# Patient Record
Sex: MALE | Race: WHITE | URBAN - METROPOLITAN AREA
[De-identification: names, ages, dates, MRNs, and addresses within clinical notes are randomized per-mention and may not be internally consistent; named-entity substitution may affect disease eponyms.]

---

## 2021-03-05 ENCOUNTER — DOCUMENTATION ONLY (OUTPATIENT)
Dept: FAMILY MEDICINE CLINIC | Age: 26
End: 2021-03-05

## 2021-03-05 ENCOUNTER — OFFICE VISIT (OUTPATIENT)
Dept: FAMILY MEDICINE CLINIC | Age: 26
End: 2021-03-05

## 2021-03-05 DIAGNOSIS — Z02.5 SPORTS PHYSICAL: Primary | ICD-10-CM

## 2021-03-05 DIAGNOSIS — U07.1 COVID-19: ICD-10-CM

## 2021-03-05 NOTE — PROGRESS NOTES
SUBJECTIVE:    Lea Doll is a 23 y.o. male who presents for a pre-participation physical.     1. Chest pain, shortness of breath or wheezing with exercise: None  2. Syncope with exercise: None  3. History of heart murmur or HTN: None  4. Concussions or head injury: 2017, soccer practice- soccer ball hit the back of head; lingering symptoms of headache, sensitivity to light x 4 days (while at [facility])  5. History of Seizure: None  6. Heat illness: None  7. Disqualifications or restrictions from sports participation in the past: None  8. Injuries to muscle, tendon, or ligament: 2017- hyperextension of knee, had tib plateau fracture  9. Fractured bone: tibial plateau fracture  10. Stress fracture: None  11. Ongoing medical conditions: None  12. Ever needed an inhaler for exercise: No  13. Sickle Cell disease or trait: None  14. Surgeries: Tonsillectomy/Adenoidectomy at age 2  12. Any unpaired organs: None  16. Vision impairment: None   17. Glasses or Contacts: None  18. Hearing impairment: None  19. Weight changes: None   20. Trying to gain/lose weight: No    COVID 12/2020, tested positive- noted chills, headache, body aches; no fever- symptoms last 4 days    Family History:  1. CAD, MI, or sudden death before the age of 48: No  2. HTN: No  3. Diabetes: No  4. Asthma: No  5. Sickle cell trait or disease: No  Maternal grandmother- leukemia    No past medical history on file. Not on File    OBJECTIVE:   There were no vitals taken for this visit. General: Alert and oriented, in no acute distress. Well nourished. SKIN: No rash. No suspicious lesions or moles. EYE: PERRL. Sclera and conjuctival clear. Extraocular movements intact. EARS: External normal, canals clear, tympanic membranes normal.   NOSE: Mucosa healthy without drainage or ulceration.   OROPHARYNX: No suspicious lesions, normal dentition, pharynx, tongue and tonsils normal.  NECK: Supple; no masses; thyroid normal.  LUNGS: Respirations unlabored; clear to auscultation bilaterally. CARDIOVASCULAR: Regular, rate, and rhythm without murmurs, gallops or rubs. ABDOMEN: Soft; nontender; nondistended; normoactive bowel sounds; no masses or organomegaly. LYMPH NODES: No significant cervial or inguinal lymphadenopathy. MUSCULOSKELETAL. NECK: FROM without pain. No cervical vertebral tenderness. BACK: FROM without pain. No obvious deformity. No vertebral tenderness. SHOULDER: FROM without pain. No AC, SC, or clavicle tenderness. RTC and deltoid strength 5/5 bilaterally. No joint laxity detected. ELBOW: FROM without pain. Flexion and extension strength 5/5 bilaterally. WRIST/HAND/FINGERS: FROM without pain.  strength 5/5 bilaterally. Wrist extension and flexion strength 5/5 bilaterally. HIP: FROM without pain. Flexion, extension, abduction, adduction strength 5/5 bilaterally. KNEE: FROM without pain. Flexion and extension strength 5/5 bilaterally. No joint laxity detected. ANKLE: FROM without pain. Flexion, extension, inversion, and eversion strength 5/5 bilaterally. No joint laxity detected. EXT: No edema. Neurovascularlly intact. Normal gait. ASSESSMENT:  Preparticipation physical exam demonstrates no reason for disqualification or restrictions. PLAN:    1. Patient is able to participate in physical activity without any restrictions. 2. Resting EKG showed normal sinus rhythm.

## 2021-03-05 NOTE — PROGRESS NOTES
SUBJECTIVE:    Efrain Joy is a 23 y.o. male who presents for a pre-participation physical.                1. Chest pain, shortness of breath or wheezing with exercise: None  2. Syncope with exercise: None  3. History of heart murmur or HTN: None  4. Concussions or head injury: 2017, soccer practice- soccer ball hit the back of head; lingering symptoms of headache, sensitivity to light x 4 days (while at [facility])  5. History of Seizure: None  6. Heat illness: None  7. Disqualifications or restrictions from sports participation in the past: None  8. Injuries to muscle, tendon, or ligament: 2017- hyperextension of knee, had tib plateau fracture  9. Fractured bone: tibial plateau fracture  10. Stress fracture: None  11. Ongoing medical conditions: None  12. Ever needed an inhaler for exercise: No  13. Sickle Cell disease or trait: None  14. Surgeries: Tonsillectomy/Adenoidectomy at age 2  12. Any unpaired organs: None  16. Vision impairment: None     17. Glasses or Contacts: None  18. Hearing impairment: None  19. Weight changes: None       20. Trying to gain/lose weight: No     COVID 12/2020, tested positive- noted chills, headache, body aches; no fever- symptoms last 4 days     Family History:  1. CAD, MI, or sudden death before the age of 48: No  2. HTN: No  3. Diabetes: No  4. Asthma: No  5. Sickle cell trait or disease: No  Maternal grandmother- leukemia     No past medical history on file.     Not on File     OBJECTIVE:   There were no vitals taken for this visit. General: Alert and oriented, in no acute distress. Well nourished. SKIN: No rash. No suspicious lesions or moles. EYE: PERRL. Sclera and conjuctival clear. Extraocular movements intact. EARS: External normal, canals clear, tympanic membranes normal.   NOSE: Mucosa healthy without drainage or ulceration.   OROPHARYNX: No suspicious lesions, normal dentition, pharynx, tongue and tonsils normal.  NECK: Supple; no masses; thyroid normal.  LUNGS: Respirations unlabored; clear to auscultation bilaterally. CARDIOVASCULAR: Regular, rate, and rhythm without murmurs, gallops or rubs. ABDOMEN: Soft; nontender; nondistended; normoactive bowel sounds; no masses or organomegaly. LYMPH NODES: No significant cervial or inguinal lymphadenopathy. MUSCULOSKELETAL. NECK: FROM without pain. No cervical vertebral tenderness. BACK: FROM without pain. No obvious deformity. No vertebral tenderness. SHOULDER: FROM without pain. No AC, SC, or clavicle tenderness. RTC and deltoid strength 5/5 bilaterally. No joint laxity detected. ELBOW: FROM without pain. Flexion and extension strength 5/5 bilaterally. WRIST/HAND/FINGERS: FROM without pain.  strength 5/5 bilaterally. Wrist extension and flexion strength 5/5 bilaterally. HIP: FROM without pain. Flexion, extension, abduction, adduction strength 5/5 bilaterally. KNEE: FROM without pain. Flexion and extension strength 5/5 bilaterally. No joint laxity detected. ANKLE: FROM without pain. Flexion, extension, inversion, and eversion strength 5/5 bilaterally. No joint laxity detected. EXT: No edema. Neurovascularlly intact. Normal gait.        ASSESSMENT:  Preparticipation physical exam demonstrates no reason for disqualification or restrictions. PLAN:    1. Patient is able to participate in physical activity without any restrictions. 2. Resting EKG showed normal sinus rhythm.

## 2022-05-31 ENCOUNTER — OFFICE VISIT (OUTPATIENT)
Dept: FAMILY MEDICINE CLINIC | Age: 27
End: 2022-05-31

## 2022-05-31 VITALS
HEIGHT: 73 IN | OXYGEN SATURATION: 100 % | SYSTOLIC BLOOD PRESSURE: 128 MMHG | HEART RATE: 55 BPM | TEMPERATURE: 97.3 F | RESPIRATION RATE: 18 BRPM | DIASTOLIC BLOOD PRESSURE: 81 MMHG

## 2022-05-31 DIAGNOSIS — S99.921A INJURY OF RIGHT FOOT, INITIAL ENCOUNTER: Primary | ICD-10-CM

## 2022-05-31 PROCEDURE — 99213 OFFICE O/P EST LOW 20 MIN: CPT | Performed by: FAMILY MEDICINE

## 2022-05-31 NOTE — PROGRESS NOTES
[id number] [facility]      Chief Complaint:   Chief Complaint   Patient presents with    Foot Injury     right foot injured on saturday. states he has swelling, feels pressure, and has pain on the top of his foot. has pain when weight bearing         Subjective:     Ramón Paredes is an 33 y.o. male who presents with right foot pain. 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 He was going for a slide tackle when the opponent landed landed on his foot. He was able to play about another 25 minutes but states he could only sprint about 80-90% due to the pain. At half time, the pain and swelling increased and he was unable to continue to play. He has been using crutches to assist ambulation due to pain with walking. No numbness or tingling. No past medical history on file. No family history on file. No Known Allergies  Social History     Socioeconomic History    Marital status: SINGLE     Spouse name: Not on file    Number of children: Not on file    Years of education: Not on file    Highest education level: Not on file   Occupational History    Not on file   Tobacco Use    Smoking status: Not on file    Smokeless tobacco: Not on file   Substance and Sexual Activity    Alcohol use: Not on file    Drug use: Not on file    Sexual activity: Not on file   Other Topics Concern    Not on file   Social History Narrative    Not on file     Social Determinants of Health     Financial Resource Strain:     Difficulty of Paying Living Expenses: Not on file   Food Insecurity:     Worried About Running Out of Food in the Last Year: Not on file    Sonia of Food in the Last Year: Not on file   Transportation Needs:     Lack of Transportation (Medical): Not on file    Lack of Transportation (Non-Medical):  Not on file   Physical Activity:     Days of Exercise per Week: Not on file    Minutes of Exercise per Session: Not on file   Stress:     Feeling of Stress : Not on file   Social Connections:     Frequency of Communication with Friends and Family: Not on file    Frequency of Social Gatherings with Friends and Family: Not on file    Attends Jainism Services: Not on file    Active Member of Clubs or Organizations: Not on file    Attends Club or Organization Meetings: Not on file    Marital Status: Not on file   Intimate Partner Violence:     Fear of Current or Ex-Partner: Not on file    Emotionally Abused: Not on file    Physically Abused: Not on file    Sexually Abused: Not on file   Housing Stability:     Unable to Pay for Housing in the Last Year: Not on file    Number of [place] in the Last Year: Not on file    Unstable Housing in the Last Year: Not on file       Review of Systems  Pertinent items are noted in HPI. Objective:     Visit Vitals  /81 (BP 1 Location: Right upper arm, BP Patient Position: Sitting, BP Cuff Size: Adult)   Pulse (!) 55   Temp 97.3 °F (36.3 °C) (Temporal)   Resp 18   Ht 6' 1\" (1.854 m)   SpO2 100%       Gen: No apparent distress. Alert and oriented. Responds to all questions appropriately. Ankle/Foot: right  Ankle Effusion: None  Great Toe MTP (normal/valgus/hallux rigidis/varus): Normal    ROM: FROM of the ankle. ROM of the all toes extension and flexion limited due to pain but able to perform. Dynamic Test:  Gait: NWB using crutches to assist ambulation due to pain.    Bell test: Normal  Tiptoe walk: unable to perform due to pain  Heel walk: unable to perform due to pain  Single heel rise: unable to perform due to pain    Palpation:  ATFL tenderness: None  CFL tenderness: None  PTFL tenderness: None  Deltoid tenderness: None  Achilles insertion tenderness: None   Achilles tendon tenderness: None   Great Toe IP joint tenderness: None  Great Toe MTP join tenderness t: None  Lisfranc Joint tenderness: mild tenderness but majority of the tenderness is lateral to the Lisfranc joint. Medial Malleolus tenderness: None  Lateral Malleolus tenderness: None  5th Metatarsal tenderness: None  Metatarsals tenderness: tender over the proximal 2-4 metatarsals. Navicular tenderness: None    Squeeze Test: Normal     Strength (0-5/5): Limited exam due to pain but able to move in all directions for the ankle and toes and strength appears intact. Plantarflexion: 5/5  Dorsiflexion: 5/5  Inversion: 5/5  Eversion: 5/5  FHL: 5/5  EHL: 5/5    Neuro/Vascular:  Pulses intact, no edema, and neurologically intact. Skin: No obvious rash    Imaging: Radiographs of the right foot personally reviewed and demonstrates no obvious fracture or dislocation. Assessment:       ICD-10-CM ICD-9-CM    1. Injury of right foot, initial encounter  S99.921A 959.7 XR FOOT RT MIN 3 V     Pain in the foot likely related to deep bruising over the dorsal foot. Will focus on swelling control and ROM. As pain improves then will advance ambulation and training as tolerated. Unlikely Lisfranc injury as the LOAN not consistent with Lisfranc injury and he was able to continue to play for 25 minutes until swelling and pain prevented further participation. If unable to advance in return to play, then will get MRI of the foot. Plan:       1. Home Exercise Program as per handout. He will work with [employer] for rehab.   2. Ice 15 minutes, three times a day PRN and after exercise. Can alternate with heat for 15 minutes. Medications:    1. Naproxin (Aleve): 220mg 1-2 tablets twice a day PRN. 2. Acetaminophen (Tylenol):  500mg 1-2 tablets every 6 hours as needed for pain. RTC: He will be followed up in the training room. If sx not improving, then will get MRI.

## 2022-05-31 NOTE — PROGRESS NOTES
Andrés De La Cruz is a 33 y.o. male    Chief Complaint   Patient presents with    Foot Injury     right foot injured on saturday. states he has swelling, feels pressure, and has pain on the top of his foot. has pain when weight bearing       1. \"Have you been to the ER, urgent care clinic since your last visit? Hospitalized since your last visit? \" No    2. \"Have you seen or consulted any other health care providers outside of the [address] [place] [address] since your last visit? \" No     3. For patients aged 39-70: Has the patient had a colonoscopy / FIT/ Cologuard? NA - based on age        [de-identified]:    05/31/22 0923   BP: 128/81   BP 1 Location: Right upper arm   BP Patient Position: Sitting   BP Cuff Size: Adult   Pulse: (!) 55   Temp: 97.3 °F (36.3 °C)   TempSrc: Temporal   Resp: 18   Height: 6' 1\" (1.854 m)   SpO2: 100%             Health Maintenance Due   Topic Date Due    Hepatitis C Screening  Never done    Depression Screen  Never done    COVID-19 Vaccine (1) Never done    DTaP/Tdap/Td series (1 - Tdap) Never done         Medication Reconciliation completed, changes noted.   Please update medication list.

## 2023-09-25 ENCOUNTER — OFFICE VISIT (OUTPATIENT)
Age: 28
End: 2023-09-25
Payer: COMMERCIAL

## 2023-09-25 ENCOUNTER — TELEPHONE (OUTPATIENT)
Age: 28
End: 2023-09-25

## 2023-09-25 ENCOUNTER — HOSPITAL ENCOUNTER (OUTPATIENT)
Facility: HOSPITAL | Age: 28
Discharge: HOME | End: 2023-09-27
Attending: FAMILY MEDICINE
Payer: COMMERCIAL

## 2023-09-25 VITALS
WEIGHT: 179 LBS | HEIGHT: 71 IN | BODY MASS INDEX: 25.06 KG/M2 | DIASTOLIC BLOOD PRESSURE: 68 MMHG | SYSTOLIC BLOOD PRESSURE: 110 MMHG

## 2023-09-25 VITALS
OXYGEN SATURATION: 99 % | SYSTOLIC BLOOD PRESSURE: 110 MMHG | WEIGHT: 179 LBS | DIASTOLIC BLOOD PRESSURE: 68 MMHG | HEART RATE: 51 BPM | BODY MASS INDEX: 23.62 KG/M2

## 2023-09-25 DIAGNOSIS — R06.09 DOE (DYSPNEA ON EXERTION): ICD-10-CM

## 2023-09-25 DIAGNOSIS — R00.2 PALPITATIONS: ICD-10-CM

## 2023-09-25 DIAGNOSIS — R06.02 SHORTNESS OF BREATH: Primary | ICD-10-CM

## 2023-09-25 DIAGNOSIS — R00.0 TACHYCARDIA: ICD-10-CM

## 2023-09-25 DIAGNOSIS — R06.02 SHORTNESS OF BREATH: ICD-10-CM

## 2023-09-25 LAB
ECHO AV MEAN GRADIENT: 3 MMHG
ECHO AV MEAN VELOCITY: 0.9 M/S
ECHO AV PEAK GRADIENT: 6 MMHG
ECHO AV PEAK VELOCITY: 1.2 M/S
ECHO AV VELOCITY RATIO: 1
ECHO AV VTI: 24.1 CM
ECHO BSA: 2.02 M2
ECHO LA DIAMETER INDEX: 1.69 CM/M2
ECHO LA DIAMETER: 3.4 CM
ECHO LA VOL 2C: 63 ML (ref 18–58)
ECHO LA VOL 2C: 67 ML (ref 18–58)
ECHO LA VOL 4C: 38 ML (ref 18–58)
ECHO LA VOL 4C: 44 ML (ref 18–58)
ECHO LA VOL BP: 50 ML (ref 18–58)
ECHO LA VOL/BSA BIPLANE: 25 ML/M2 (ref 16–34)
ECHO LA VOLUME AREA LENGTH: 56 ML
ECHO LA VOLUME INDEX AREA LENGTH: 28 ML/M2 (ref 16–34)
ECHO LV E' LATERAL VELOCITY: 10 CM/S
ECHO LV E' SEPTAL VELOCITY: 10 CM/S
ECHO LV EDV A2C: 197 ML
ECHO LV EDV A4C: 122 ML
ECHO LV EDV BP: 161 ML (ref 67–155)
ECHO LV EDV INDEX A4C: 61 ML/M2
ECHO LV EDV INDEX BP: 80 ML/M2
ECHO LV EDV NDEX A2C: 98 ML/M2
ECHO LV EJECTION FRACTION A2C: 60 %
ECHO LV EJECTION FRACTION A4C: 49 %
ECHO LV EJECTION FRACTION BIPLANE: 56 % (ref 55–100)
ECHO LV ESV A2C: 79 ML
ECHO LV ESV A4C: 62 ML
ECHO LV ESV BP: 71 ML (ref 22–58)
ECHO LV ESV INDEX A2C: 39 ML/M2
ECHO LV ESV INDEX A4C: 31 ML/M2
ECHO LV ESV INDEX BP: 35 ML/M2
ECHO LV FRACTIONAL SHORTENING: 30 % (ref 28–44)
ECHO LV INTERNAL DIMENSION DIASTOLE INDEX: 2.64 CM/M2
ECHO LV INTERNAL DIMENSION DIASTOLIC MMODE: 5.6 CM (ref 4.2–5.9)
ECHO LV INTERNAL DIMENSION DIASTOLIC: 5.3 CM (ref 4.2–5.9)
ECHO LV INTERNAL DIMENSION SYSTOLIC INDEX: 1.84 CM/M2
ECHO LV INTERNAL DIMENSION SYSTOLIC MMODE: 4.2 CM
ECHO LV INTERNAL DIMENSION SYSTOLIC: 3.7 CM
ECHO LV IVSD: 0.9 CM (ref 0.6–1)
ECHO LV MASS 2D: 162.1 G (ref 88–224)
ECHO LV MASS INDEX 2D: 80.7 G/M2 (ref 49–115)
ECHO LV POSTERIOR WALL DIASTOLIC: 0.8 CM (ref 0.6–1)
ECHO LV RELATIVE WALL THICKNESS RATIO: 0.3
ECHO LVOT AV VTI INDEX: 1
ECHO LVOT MEAN GRADIENT: 3 MMHG
ECHO LVOT PEAK GRADIENT: 6 MMHG
ECHO LVOT PEAK VELOCITY: 1.2 M/S
ECHO LVOT VTI: 24.2 CM
ECHO MV A VELOCITY: 0.45 M/S
ECHO MV E DECELERATION TIME (DT): 461.9 MS
ECHO MV E VELOCITY: 0.85 M/S
ECHO MV E/A RATIO: 1.89
ECHO MV E/E' LATERAL: 8.5
ECHO MV E/E' RATIO (AVERAGED): 8.5
ECHO MV E/E' SEPTAL: 8.5
ECHO MV LVOT VTI INDEX: 1.05
ECHO MV MAX VELOCITY: 0.8 M/S
ECHO MV MEAN GRADIENT: 1 MMHG
ECHO MV MEAN VELOCITY: 0.4 M/S
ECHO MV PEAK GRADIENT: 3 MMHG
ECHO MV VTI: 25.3 CM
ECHO PV MAX VELOCITY: 0.9 M/S
ECHO PV PEAK GRADIENT: 3 MMHG

## 2023-09-25 PROCEDURE — 93306 TTE W/DOPPLER COMPLETE: CPT | Performed by: SPECIALIST

## 2023-09-25 PROCEDURE — 99213 OFFICE O/P EST LOW 20 MIN: CPT | Performed by: FAMILY MEDICINE

## 2023-09-25 PROCEDURE — 93306 TTE W/DOPPLER COMPLETE: CPT

## 2023-09-25 PROCEDURE — 93000 ELECTROCARDIOGRAM COMPLETE: CPT | Performed by: FAMILY MEDICINE

## 2023-09-25 SDOH — ECONOMIC STABILITY: FOOD INSECURITY: WITHIN THE PAST 12 MONTHS, THE FOOD YOU BOUGHT JUST DIDN'T LAST AND YOU DIDN'T HAVE MONEY TO GET MORE.: NEVER TRUE

## 2023-09-25 SDOH — ECONOMIC STABILITY: INCOME INSECURITY: HOW HARD IS IT FOR YOU TO PAY FOR THE VERY BASICS LIKE FOOD, HOUSING, MEDICAL CARE, AND HEATING?: NOT HARD AT ALL

## 2023-09-25 SDOH — ECONOMIC STABILITY: HOUSING INSECURITY
IN THE LAST 12 MONTHS, WAS THERE A TIME WHEN YOU DID NOT HAVE A STEADY PLACE TO SLEEP OR SLEPT IN A SHELTER (INCLUDING NOW)?: NO

## 2023-09-25 SDOH — ECONOMIC STABILITY: FOOD INSECURITY: WITHIN THE PAST 12 MONTHS, YOU WORRIED THAT YOUR FOOD WOULD RUN OUT BEFORE YOU GOT MONEY TO BUY MORE.: NEVER TRUE

## 2023-09-25 ASSESSMENT — PATIENT HEALTH QUESTIONNAIRE - PHQ9
SUM OF ALL RESPONSES TO PHQ QUESTIONS 1-9: 0
2. FEELING DOWN, DEPRESSED OR HOPELESS: 0
SUM OF ALL RESPONSES TO PHQ9 QUESTIONS 1 & 2: 0
SUM OF ALL RESPONSES TO PHQ QUESTIONS 1-9: 0
1. LITTLE INTEREST OR PLEASURE IN DOING THINGS: 0

## 2023-09-25 NOTE — PROGRESS NOTES
[address] [facility]      Chief Complaint:   Chief Complaint   Patient presents with    Shortness of Breath       SUBJECTIVE:  Erick Campbell is a 33 y.o. male who presents for intermittent palpitations and tachycardia. Sx for about 2 years and have come about once a month. Last episode was about 2 weeks ago. Sx seem to come more frequent over the last 6 months. 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 When he had the episode 2 weeks ago he was able to track his heart rate and noticed that his heart rate was in the 120-140s which is his normal resting heart rate but hen jumped to a rate of 205 when he started to have sx. He could feel his heart racing and felt short of breath. He said he could \"feel his heart beat into his throat\" but no chest pain. No lightheadedness or dizziness. No n/v. He has to stop exercising for 1-2 minutes and his heart returns to baseline and he can continue exercising without sx. He has no significant PMHx and no medications. He takes creatine supplement but no other supplements or OTC meds. Family h/o father with stroke in his 46s and dx with ventral septal defect. No other significant family hx. 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     PMHx:  No past medical history on file. Meds:   No current outpatient medications on file. No current facility-administered medications for this visit. Allergies:   No Known Allergies    Smoker:  Social History     Tobacco Use   Smoking Status Unknown   Smokeless Tobacco Not on file       ETOH:   Social History     Substance and Sexual Activity   Alcohol Use Not Currently       FH: No family history on file.     ROS:  General/Constitutional:   No headache, fever, fatigue, weight loss or weight gain       Eyes:   No visual changes      Ears:    No changes      Neck:

## 2023-09-25 NOTE — PROGRESS NOTES
2021 started having high pulse rates while working out. HE stated two weeks ago he was working out and his pulse would elevate up to 220 back to 141 and after it would happen patient states he would have shortness of breath and tension in his throat.     Vitals:    09/25/23 0936   BP: 110/68   Pulse: 51   SpO2: 99%      Chief Complaint   Patient presents with    Shortness of Breath

## 2023-09-25 NOTE — TELEPHONE ENCOUNTER
Spoke to Dr. Adela Girard. Patient occasionally with heart racing spells around 200 bpm when exercising (baseline HR exercising is 120-140 bpm). Will check a 30 day monitor, ETT, and refer to EP.

## 2023-09-26 LAB
ALBUMIN SERPL-MCNC: 4.6 G/DL (ref 3.5–5)
ALBUMIN/GLOB SERPL: 1.5 (ref 1.1–2.2)
ALP SERPL-CCNC: 78 U/L (ref 45–117)
ALT SERPL-CCNC: 24 U/L (ref 12–78)
ANION GAP SERPL CALC-SCNC: 3 MMOL/L (ref 5–15)
AST SERPL-CCNC: 16 U/L (ref 15–37)
BILIRUB SERPL-MCNC: 0.8 MG/DL (ref 0.2–1)
BUN SERPL-MCNC: 16 MG/DL (ref 6–20)
BUN/CREAT SERPL: 14 (ref 12–20)
CALCIUM SERPL-MCNC: 9.6 MG/DL (ref 8.5–10.1)
CHLORIDE SERPL-SCNC: 106 MMOL/L (ref 97–108)
CHOLEST SERPL-MCNC: 173 MG/DL
CO2 SERPL-SCNC: 31 MMOL/L (ref 21–32)
CREAT SERPL-MCNC: 1.18 MG/DL (ref 0.7–1.3)
ERYTHROCYTE [DISTWIDTH] IN BLOOD BY AUTOMATED COUNT: 11.1 % (ref 11.5–14.5)
GLOBULIN SER CALC-MCNC: 3 G/DL (ref 2–4)
GLUCOSE SERPL-MCNC: 89 MG/DL (ref 65–100)
HCT VFR BLD AUTO: 42.6 % (ref 36.6–50.3)
HDLC SERPL-MCNC: 62 MG/DL
HDLC SERPL: 2.8 (ref 0–5)
HGB BLD-MCNC: 14.3 G/DL (ref 12.1–17)
LDLC SERPL CALC-MCNC: 104 MG/DL (ref 0–100)
MCH RBC QN AUTO: 31.1 PG (ref 26–34)
MCHC RBC AUTO-ENTMCNC: 33.6 G/DL (ref 30–36.5)
MCV RBC AUTO: 92.6 FL (ref 80–99)
NRBC # BLD: 0 K/UL (ref 0–0.01)
NRBC BLD-RTO: 0 PER 100 WBC
PLATELET # BLD AUTO: 291 K/UL (ref 150–400)
PMV BLD AUTO: 10.7 FL (ref 8.9–12.9)
POTASSIUM SERPL-SCNC: 4.8 MMOL/L (ref 3.5–5.1)
PROT SERPL-MCNC: 7.6 G/DL (ref 6.4–8.2)
RBC # BLD AUTO: 4.6 M/UL (ref 4.1–5.7)
SODIUM SERPL-SCNC: 140 MMOL/L (ref 136–145)
TRIGL SERPL-MCNC: 35 MG/DL
TSH SERPL DL<=0.05 MIU/L-ACNC: 2.77 UIU/ML (ref 0.36–3.74)
VLDLC SERPL CALC-MCNC: 7 MG/DL
WBC # BLD AUTO: 5 K/UL (ref 4.1–11.1)

## 2023-09-27 ENCOUNTER — TELEPHONE (OUTPATIENT)
Age: 28
End: 2023-09-27

## 2023-09-28 ENCOUNTER — TELEPHONE (OUTPATIENT)
Age: 28
End: 2023-09-28

## 2023-09-28 NOTE — TELEPHONE ENCOUNTER
----- Message from Elva Krabbe, MD sent at 9/27/2023  4:40 PM EDT -----  Regarding: RE: ETT, EP appt and event monitor  His cell number listed should be working because I've talked with him via this number. Please try again and I will let him know to expect a call. Thanks. Dr. Duncan Matias  ----- Message -----  From: Crystal Baker  Sent: 9/27/2023   9:20 AM EDT  To: Babita Sotelo MD; Elva Krabbe, MD; #  Subject: RE: ETT, EP appt and event monitor               Hello Sir,    I tried calling pt and the phone number is not working and he does not have [address] [place] [address]. I will keep trying. Leonard Harper  ----- Message -----  From: Babita Sotelo MD  Sent: 9/25/2023   3:27 PM EDT  To: Elva Krabbe, MD; Gena Lee; Crystal Baker  Subject: ETT, EP appt and event monitor                   Serg Le - can you please send patient for a 30 day event monitor. SERG Bryson - can you please set him up for a treadmill stress test (soon) and appt with Dr. Erich Watts in 6 weeks or so. Spoke to Dr. Duncan Matias. Patient occasionally with heart racing spells around 200 bpm when exercising (baseline HR exercising is 120-140 bpm). Thanks!   RECESS.

## 2023-10-04 ENCOUNTER — TELEPHONE (OUTPATIENT)
Age: 28
End: 2023-10-04

## 2023-10-04 DIAGNOSIS — R00.2 PALPITATIONS: ICD-10-CM

## 2023-10-04 DIAGNOSIS — R06.09 DOE (DYSPNEA ON EXERTION): ICD-10-CM

## 2023-10-04 DIAGNOSIS — R06.02 SHORTNESS OF BREATH: ICD-10-CM

## 2023-10-04 DIAGNOSIS — R00.0 TACHYCARDIA: Primary | ICD-10-CM

## 2023-10-04 NOTE — TELEPHONE ENCOUNTER
----- Message from Cherry Cavazos sent at 10/4/2023  9:45 AM EDT -----  Regarding: Need an order  Please provide an order for scheduled routine stress test.     Thank you,     Tyler Hernandez

## 2023-10-18 ENCOUNTER — TELEPHONE (OUTPATIENT)
Age: 28
End: 2023-10-18

## 2023-10-18 NOTE — TELEPHONE ENCOUNTER
Attached weekly summary to the patients monitor order. Thought you may want to take a look because the patient had a lot of PSVT, tachy, and ruben. Thank you.

## 2023-11-13 ENCOUNTER — TELEPHONE (OUTPATIENT)
Age: 28
End: 2023-11-13

## 2023-11-13 NOTE — TELEPHONE ENCOUNTER
Treadmill Stress Test 10/12/23 - walked 15 min (18 METS) - normal study     Event Monitor 11/1/23 - wore 20 days - Avg HR 67 bpm.  Single pause 2.2 sec. Single PSVT Lasting 31 seconds, Rate 165 BPM.  1% PVC burden. Overall normal study.     Will call patient and send Dr. Yoli Jon a message

## 2023-11-15 ENCOUNTER — TELEPHONE (OUTPATIENT)
Age: 28
End: 2023-11-15

## 2023-11-15 NOTE — TELEPHONE ENCOUNTER
Per Dr. Cesar Salas: \"Hi Dakota Subramanian - Mr. Perry echo, stress test and event monitor are all normal.   An appt has not been made. And if he is doing fine, he doesn't need an appt. If he is still having symptoms, I could have him come in and see EP. Just let me know.    Thanks, [employer] \"

## 2024-05-02 ENCOUNTER — OFFICE VISIT (OUTPATIENT)
Age: 29
End: 2024-05-02
Payer: COMMERCIAL

## 2024-05-02 ENCOUNTER — ANCILLARY PROCEDURE (OUTPATIENT)
Age: 29
End: 2024-05-02
Payer: COMMERCIAL

## 2024-05-02 VITALS
HEART RATE: 57 BPM | RESPIRATION RATE: 18 BRPM | OXYGEN SATURATION: 95 % | DIASTOLIC BLOOD PRESSURE: 55 MMHG | SYSTOLIC BLOOD PRESSURE: 101 MMHG

## 2024-05-02 DIAGNOSIS — S99.911A INJURY OF RIGHT ANKLE, INITIAL ENCOUNTER: ICD-10-CM

## 2024-05-02 DIAGNOSIS — S99.911A INJURY OF RIGHT ANKLE, INITIAL ENCOUNTER: Primary | ICD-10-CM

## 2024-05-02 PROCEDURE — 99213 OFFICE O/P EST LOW 20 MIN: CPT | Performed by: FAMILY MEDICINE

## 2024-05-02 PROCEDURE — 73610 X-RAY EXAM OF ANKLE: CPT

## 2024-05-02 ASSESSMENT — PATIENT HEALTH QUESTIONNAIRE - PHQ9
SUM OF ALL RESPONSES TO PHQ QUESTIONS 1-9: 0
SUM OF ALL RESPONSES TO PHQ QUESTIONS 1-9: 0
2. FEELING DOWN, DEPRESSED OR HOPELESS: NOT AT ALL
SUM OF ALL RESPONSES TO PHQ QUESTIONS 1-9: 0
1. LITTLE INTEREST OR PLEASURE IN DOING THINGS: NOT AT ALL
SUM OF ALL RESPONSES TO PHQ QUESTIONS 1-9: 0
SUM OF ALL RESPONSES TO PHQ9 QUESTIONS 1 & 2: 0

## 2024-05-02 NOTE — PROGRESS NOTES
Patient was practicing and he tried blocking a right side shot and it came in full force and hit the ball with his right foot while in the air and landed on it at a slant. He instantly had pain in his ankle and when he tries to flex or rotate its very limited with pain.  Chief Complaint   Patient presents with    Ankle Injury     right     Vitals:    05/02/24 1232   BP: (!) 101/55   Pulse: 57   Resp: 18   SpO2: 95%

## 2024-05-02 NOTE — PROGRESS NOTES
[facility]   [facility]      Chief Complaint: No chief complaint on file.        Subjective:     Paul Madden is an 29 y.o. male who presents for evaluation of right ankle injury.  He had an inversion ankle injury during training today with the [employer].  Unable to continue due to pain.  Present on crutches.  No numbness.       No past medical history on file.  No family history on file.  No current outpatient medications on file.     No current facility-administered medications for this visit.     No Known Allergies  Social History     Socioeconomic History    Marital status: Single     Spouse name: Not on file    Number of children: Not on file    Years of education: Not on file    Highest education level: Not on file   Occupational History    Not on file   Tobacco Use    Smoking status: Unknown    Smokeless tobacco: Not on file   Vaping Use    Vaping Use: Not on file   Substance and Sexual Activity    Alcohol use: Not Currently    Drug use: Never    Sexual activity: Not Currently   Other Topics Concern    Not on file   Social History Narrative    Not on file     Social Determinants of Health     Financial Resource Strain: Low Risk  (9/25/2023)    Overall Financial Resource Strain (CARDIA)     Difficulty of Paying Living Expenses: Not hard at all   Food Insecurity: Not on file (9/25/2023)   Transportation Needs: Unknown (9/25/2023)    PRAPARE - Transportation     Lack of Transportation (Medical): Not on file     Lack of Transportation (Non-Medical): No   Physical Activity: Not on file   Stress: Not on file   Social Connections: Not on file   Intimate Partner Violence: Not on file   Housing Stability: Unknown (9/25/2023)    Housing Stability Vital Sign     Unable to Pay for Housing in the Last Year: Not on file     Number of Places Lived in the Last Year: Not on file     Unstable Housing in the Last Year: No       Review of

## 2024-05-29 DIAGNOSIS — R52 MECHANICAL PAIN: ICD-10-CM

## 2024-05-29 DIAGNOSIS — S99.911D INJURY OF RIGHT ANKLE, SUBSEQUENT ENCOUNTER: ICD-10-CM

## 2024-05-29 DIAGNOSIS — M25.571 ACUTE RIGHT ANKLE PAIN: Primary | ICD-10-CM

## 2024-05-29 NOTE — PROGRESS NOTES
[facility]   [facility]      Chief Complaint: ankle pain      Subjective:     Paul Madden is an 29 y.o. male who presents with right ankle pain.  He injured the right ankle about 4 weeks ago during training with the [employer].  He has been doing rehab with team [employer].  Sx were gradually improving until 4 days ago when he developed increased pain in the medial and lateral posterior ankle.  Pain worsened after riding the exercise bike.  No new injury.  He felt locking and decreased ROM over the last couple of days.      Patient was evaluated in the training room today.     No past medical history on file.  No family history on file.  No current outpatient medications on file.     No current facility-administered medications for this visit.     No Known Allergies  Social History     Socioeconomic History    Marital status: Single     Spouse name: Not on file    Number of children: Not on file    Years of education: Not on file    Highest education level: Not on file   Occupational History    Not on file   Tobacco Use    Smoking status: Unknown    Smokeless tobacco: Not on file   Vaping Use    Vaping Use: Not on file   Substance and Sexual Activity    Alcohol use: Not Currently    Drug use: Never    Sexual activity: Not Currently   Other Topics Concern    Not on file   Social History Narrative    Not on file     Social Determinants of Health     Financial Resource Strain: Low Risk  (9/25/2023)    Overall Financial Resource Strain (CARDIA)     Difficulty of Paying Living Expenses: Not hard at all   Food Insecurity: Not on file (9/25/2023)   Transportation Needs: Unknown (9/25/2023)    PRAPARE - Transportation     Lack of Transportation (Medical): Not on file     Lack of Transportation (Non-Medical): No   Physical Activity: Not on file   Stress: Not on file   Social Connections: Not on file   Intimate Partner Violence: Not on file

## 2024-06-05 ENCOUNTER — HOSPITAL ENCOUNTER (OUTPATIENT)
Facility: HOSPITAL | Age: 29
Discharge: HOME | End: 2024-06-08
Attending: FAMILY MEDICINE
Payer: COMMERCIAL

## 2024-06-05 DIAGNOSIS — R52 MECHANICAL PAIN: ICD-10-CM

## 2024-06-05 DIAGNOSIS — S99.911D INJURY OF RIGHT ANKLE, SUBSEQUENT ENCOUNTER: ICD-10-CM

## 2024-06-05 DIAGNOSIS — M25.571 ACUTE RIGHT ANKLE PAIN: ICD-10-CM

## 2024-06-05 PROCEDURE — 73721 MRI JNT OF LWR EXTRE W/O DYE: CPT

## 2024-06-11 ENCOUNTER — HOSPITAL ENCOUNTER (OUTPATIENT)
Dept: PHYSICAL THERAPY | Facility: HOSPITAL | Age: 29
Setting detail: RECURRING SERIES
Discharge: HOME | End: 2024-06-14
Payer: COMMERCIAL

## 2024-06-11 PROCEDURE — 97162 PT EVAL MOD COMPLEX 30 MIN: CPT | Performed by: PHYSICAL THERAPIST

## 2024-06-11 PROCEDURE — 97016 VASOPNEUMATIC DEVICE THERAPY: CPT

## 2024-06-11 PROCEDURE — 97112 NEUROMUSCULAR REEDUCATION: CPT

## 2024-06-11 PROCEDURE — 97110 THERAPEUTIC EXERCISES: CPT

## 2024-06-11 PROCEDURE — 97140 MANUAL THERAPY 1/> REGIONS: CPT

## 2024-06-11 NOTE — THERAPY EVALUATION
[facility]   a part of [address], [address] [place] [address]  Phone: [phone]  Fax: [phone]           PHYSICAL THERAPY - EVALUATION/PLAN OF CARE NOTE (updated 3/23)      Date: 2024          Patient Name:  Paul Madden :  1995   Medical   Diagnosis:  No admission diagnoses are documented for this encounter. Treatment Diagnosis:  {RVA Dx List:82283}   Referral Source:  Davonte Shannon MD Provider #:  [id number]                Insurance: Payor: GENERIC SELF-INSURED / Plan: GENERIC SELF-INSURED WC / Product Type: *No Product type* /      Patient  verified {YES/NO DIET:032845415}     Visit #   Current  / Total 1 ***   Time   In / Out *** ***   Total Treatment Time ***   Total Timed Codes ***         SUBJECTIVE  Pain Level (0-10 scale): ***  []constant []intermittent []improving []worsening []no change since onset    Any medication changes, allergies to medications, adverse drug reactions, diagnosis change, or new procedure performed?: [x] No    [] Yes (see summary sheet for update)  Medications: Verified on Patient Summary List    Subjective functional status/changes:     2014     Start of Care: 2024  Onset Date: ***  Current symptoms/Complaints: ***  Mechanism of Injury: ***  PLOF: ***  Limitations to PLOF/Activity or Recreational Limitations: ***  Work Hx: ***   Living Situation: ***   Mobility: ***  Self Care: ***  Previous Treatment/Compliance: ***  PMHx/Surgical Hx/Comorbidites:    has no past medical history on file.    No past surgical history on file.     Prior Hospitalization:  ***   Barriers: []pain []Financial []time []transportation []Other:  Substance use: []Alcohol []Tobacco []other:   Pt Goals: ***  Motivation: ***  Cognition: A & O x ***              OBJECTIVE    Posture:  ***  Other Observations:  ***  Gait and Functional Mobility:  ***  Palpation: ***    Right Ankle AROM 20

## 2024-06-12 ENCOUNTER — HOSPITAL ENCOUNTER (OUTPATIENT)
Dept: PHYSICAL THERAPY | Facility: HOSPITAL | Age: 29
Setting detail: RECURRING SERIES
Discharge: HOME | End: 2024-06-15
Payer: COMMERCIAL

## 2024-06-12 PROCEDURE — 97016 VASOPNEUMATIC DEVICE THERAPY: CPT | Performed by: PHYSICAL THERAPIST

## 2024-06-12 PROCEDURE — 97112 NEUROMUSCULAR REEDUCATION: CPT | Performed by: PHYSICAL THERAPIST

## 2024-06-12 PROCEDURE — 97140 MANUAL THERAPY 1/> REGIONS: CPT | Performed by: PHYSICAL THERAPIST

## 2024-06-12 PROCEDURE — 97110 THERAPEUTIC EXERCISES: CPT | Performed by: PHYSICAL THERAPIST

## 2024-06-12 NOTE — PROGRESS NOTES
PHYSICAL THERAPY - DAILY TREATMENT NOTE (updated 3/23)      Date: 2024          Patient Name:  Paul Madden :  1995   Medical   Diagnosis:  No admission diagnoses are documented for this encounter. Treatment Diagnosis:  M25.571 RIGHT ANKLE PAIN and pain in the joint of the right foot     Referral Source:  Davonte Shannon MD Insurance:   Payor: GENERIC SELF-INSURED / Plan: GENERIC SELF-INSURED WC / Product Type: *No Product type* /                     Patient  verified yes     Visit #   Current  / Total 2 ***   Time   In / Out 245p 400p   Total Treatment Time ***   Total Timed Codes ***         SUBJECTIVE    Pain Level (0-10 scale): ***    Any medication changes, allergies to medications, adverse drug reactions, diagnosis change, or new procedure performed?: [x] No    [] Yes (see summary sheet for update)  Medications: Verified on Patient Summary List    Subjective functional status/changes:     ***    OBJECTIVE      Therapeutic Procedures:  Tx Min Billable or 1:1 Min (if diff from Tx Min) Procedure, Rationale, Specifics     {RVA There Procedures:04778}     Details if applicable:       {RVA There Procedures:57278}     Details if applicable:       {RVA There Procedures:47332}    Details if applicable:       {RVA There Procedures:70112}    Details if applicable:       {RVA There Procedures:54849}     Details if applicable:     ***     Total Total         Modalities Rationale:     {[employer] Modality Rationale:72547} to improve patient's ability to progress to PLOF and address remaining functional goals.       min [] Estim Unattended,             type/location:       []  w/ice    []  w/heat        min [] Estim Attended,             type/location:       []  w/ice   []  w/heat         []  w/US   []  TENS insruct            min []  Mechanical Traction,        type/lbs:        []  pro      []  sup           []  int       []  cont            []  before manual           []  after manual     min []

## 2024-06-18 ENCOUNTER — APPOINTMENT (OUTPATIENT)
Dept: PHYSICAL THERAPY | Facility: HOSPITAL | Age: 29
End: 2024-06-18
Payer: COMMERCIAL

## 2024-06-19 ENCOUNTER — APPOINTMENT (OUTPATIENT)
Dept: PHYSICAL THERAPY | Facility: HOSPITAL | Age: 29
End: 2024-06-19
Payer: COMMERCIAL

## 2024-06-21 ENCOUNTER — APPOINTMENT (OUTPATIENT)
Dept: PHYSICAL THERAPY | Facility: HOSPITAL | Age: 29
End: 2024-06-21
Payer: COMMERCIAL

## 2024-06-26 ENCOUNTER — HOSPITAL ENCOUNTER (OUTPATIENT)
Dept: PHYSICAL THERAPY | Facility: HOSPITAL | Age: 29
Setting detail: RECURRING SERIES
Discharge: HOME | End: 2024-06-29
Payer: COMMERCIAL

## 2024-06-26 PROCEDURE — 97110 THERAPEUTIC EXERCISES: CPT

## 2024-06-26 PROCEDURE — 97112 NEUROMUSCULAR REEDUCATION: CPT

## 2024-06-26 PROCEDURE — 97140 MANUAL THERAPY 1/> REGIONS: CPT

## 2024-06-26 NOTE — PROGRESS NOTES
PHYSICAL THERAPY - DAILY TREATMENT NOTE (updated 3/23)      Date: 2024          Patient Name:  Paul Madden :  1995   Medical   Diagnosis:  Right ankle pain [M25.571] Treatment Diagnosis:  M25.571 RIGHT ANKLE PAIN and pain in the joint of the right foot     Referral Source:  Davonte Shannon MD Insurance:   Payor: GENERIC SELF-INSURED / Plan: GENERIC SELF-INSURED WC / Product Type: *No Product type* /                     Patient  verified yes     Visit #   Current  / Total 3 16   Time   In / Out 9:15A 10:35A   Total Treatment Time 80   Total Timed Codes 70         SUBJECTIVE    Pain Level (0-10 scale): 0/10    Any medication changes, allergies to medications, adverse drug reactions, diagnosis change, or new procedure performed?: [x] No    [] Yes (see summary sheet for update)  Medications: Verified on Patient Summary List    Subjective functional status/changes:     Pt repots some soreness after last session.     OBJECTIVE      Therapeutic Procedures:  Tx Min Billable or 1:1 Min (if diff from Tx Min) Procedure, Rationale, Specifics   25  95237 Manual Therapy (timed):  decrease pain, increase ROM, and increase tissue extensibility to improve patient's ability to progress to PLOF and address remaining functional goals.  The manual therapy interventions were performed at a separate and distinct time from the therapeutic activities interventions . (see flow sheet as applicable)     Details if applicable:  A-P talocrural mobilization, STM to fibularis muscle tendons region, gastroc, sloeus   30  77564 Neuromuscular Re-Education (timed):  improve balance, coordination, kinesthetic sense, posture, core stability and proprioception to improve patient's ability to develop conscious control of individual muscles and awareness of position of extremities in order to progress to PLOF and address remaining functional goals. (see flow sheet as applicable)     Details if applicable:  BAPS movement training

## 2024-06-28 ENCOUNTER — HOSPITAL ENCOUNTER (OUTPATIENT)
Dept: PHYSICAL THERAPY | Facility: HOSPITAL | Age: 29
Setting detail: RECURRING SERIES
Discharge: HOME | End: 2024-07-01
Payer: COMMERCIAL

## 2024-06-28 PROCEDURE — 97530 THERAPEUTIC ACTIVITIES: CPT | Performed by: PHYSICAL THERAPIST

## 2024-06-28 PROCEDURE — 97016 VASOPNEUMATIC DEVICE THERAPY: CPT | Performed by: PHYSICAL THERAPIST

## 2024-06-28 PROCEDURE — 97140 MANUAL THERAPY 1/> REGIONS: CPT | Performed by: PHYSICAL THERAPIST

## 2024-06-28 PROCEDURE — 97112 NEUROMUSCULAR REEDUCATION: CPT | Performed by: PHYSICAL THERAPIST

## 2024-06-28 PROCEDURE — 97110 THERAPEUTIC EXERCISES: CPT | Performed by: PHYSICAL THERAPIST

## 2024-06-28 NOTE — PROGRESS NOTES
PHYSICAL THERAPY - DAILY TREATMENT NOTE (updated 3/23)      Date: 2024          Patient Name:  Paul Madden :  1995   Medical   Diagnosis:  Right ankle pain [M25.571] Treatment Diagnosis:  M25.571 RIGHT ANKLE PAIN and pain in the joint of the right foot     Referral Source:  Davonte Shannon MD Insurance:   Payor: GENERIC SELF-INSURED / Plan: GENERIC SELF-INSURED WC / Product Type: *No Product type* /                     Patient  verified yes     Visit #   Current  / Total 4 16   Time   In / Out 920a 1022a   Total Treatment Time 62   Total Timed Codes 52     SUBJECTIVE    Pain Level (0-10 scale): 0/10    Any medication changes, allergies to medications, adverse drug reactions, diagnosis change, or new procedure performed?: [x] No    [] Yes (see summary sheet for update)  Medications: Verified on Patient Summary List    Subjective functional status/changes:     Feels like he is really making progress the last few days. He has been doing a little bit of jogging without issues.     OBJECTIVE      Therapeutic Procedures:  Tx Min Billable or 1:1 Min (if diff from Tx Min) Procedure, Rationale, Specifics   10  94628 Manual Therapy (timed):  decrease pain, increase ROM, and increase tissue extensibility to improve patient's ability to progress to PLOF and address remaining functional goals.  The manual therapy interventions were performed at a separate and distinct time from the therapeutic activities interventions . (see flow sheet as applicable)     Details if applicable:  A-P talocrural mobilization, STM to fibularis muscle tendons region, gastroc, sloeus   15  75525 Therapeutic Activity (timed):  use of dynamic activities replicating functional movements to increase ROM, strength, coordination, balance, and proprioception in order to improve patient's ability to progress to PLOF and address remaining functional goals.  (see flow sheet as applicable)    Details if applicable:   plyometrics, box

## 2024-07-01 ENCOUNTER — HOSPITAL ENCOUNTER (OUTPATIENT)
Dept: PHYSICAL THERAPY | Facility: HOSPITAL | Age: 29
Setting detail: RECURRING SERIES
Discharge: HOME | End: 2024-07-04
Payer: COMMERCIAL

## 2024-07-01 PROCEDURE — 97110 THERAPEUTIC EXERCISES: CPT | Performed by: PHYSICAL THERAPIST

## 2024-07-01 PROCEDURE — 97140 MANUAL THERAPY 1/> REGIONS: CPT | Performed by: PHYSICAL THERAPIST

## 2024-07-01 PROCEDURE — 97112 NEUROMUSCULAR REEDUCATION: CPT | Performed by: PHYSICAL THERAPIST

## 2024-07-01 PROCEDURE — 97530 THERAPEUTIC ACTIVITIES: CPT | Performed by: PHYSICAL THERAPIST

## 2024-07-01 NOTE — PROGRESS NOTES
PHYSICAL THERAPY - DAILY TREATMENT NOTE (updated 3/23)      Date: 2024          Patient Name:  Paul Madden :  1995   Medical   Diagnosis:  Right ankle pain [M25.571] Treatment Diagnosis:  M25.571 RIGHT ANKLE PAIN and pain in the joint of the right foot     Referral Source:  Davonte Shannon MD Insurance:   Payor: GENERIC SELF-INSURED / Plan: GENERIC SELF-INSURED WC / Product Type: *No Product type* /                     Patient  verified yes     Visit #   Current  / Total 5 16   Time   In / Out 946a 1046a   Total Treatment Time 60   Total Timed Codes 60     SUBJECTIVE    Pain Level (0-10 scale): 0/10    Any medication changes, allergies to medications, adverse drug reactions, diagnosis change, or new procedure performed?: [x] No    [] Yes (see summary sheet for update)  Medications: Verified on Patient Summary List    Subjective functional status/changes:     Pt states he is making progress but does still feel a little unstable at higher speeds.  No pain with in-line running at last practice with his ankle tapped.    OBJECTIVE      Therapeutic Procedures:  Tx Min Billable or 1:1 Min (if diff from Tx Min) Procedure, Rationale, Specifics   10  94865 Manual Therapy (timed):  decrease pain, increase ROM, and increase tissue extensibility to improve patient's ability to progress to PLOF and address remaining functional goals.  The manual therapy interventions were performed at a separate and distinct time from the therapeutic activities interventions . (see flow sheet as applicable)     Details if applicable:  A-P talocrural mobilization, STM to distal achilles   15  42403 Therapeutic Activity (timed):  use of dynamic activities replicating functional movements to increase ROM, strength, coordination, balance, and proprioception in order to improve patient's ability to progress to PLOF and address remaining functional goals.  (see flow sheet as applicable)    Details if applicable:   plyometrics,

## 2024-07-05 ENCOUNTER — APPOINTMENT (OUTPATIENT)
Dept: PHYSICAL THERAPY | Facility: HOSPITAL | Age: 29
End: 2024-07-05
Payer: COMMERCIAL

## 2024-07-11 ENCOUNTER — APPOINTMENT (OUTPATIENT)
Dept: PHYSICAL THERAPY | Facility: HOSPITAL | Age: 29
End: 2024-07-11
Payer: COMMERCIAL